# Patient Record
Sex: MALE | Race: WHITE | URBAN - METROPOLITAN AREA
[De-identification: names, ages, dates, MRNs, and addresses within clinical notes are randomized per-mention and may not be internally consistent; named-entity substitution may affect disease eponyms.]

---

## 2024-05-19 ENCOUNTER — HOSPITAL ENCOUNTER (EMERGENCY)
Age: 49
Discharge: HOME | End: 2024-05-19
Attending: EMERGENCY MEDICINE
Payer: COMMERCIAL

## 2024-05-19 ENCOUNTER — APPOINTMENT (OUTPATIENT)
Dept: GENERAL RADIOLOGY | Age: 49
End: 2024-05-19
Payer: COMMERCIAL

## 2024-05-19 VITALS
HEART RATE: 90 BPM | RESPIRATION RATE: 18 BRPM | TEMPERATURE: 98.5 F | SYSTOLIC BLOOD PRESSURE: 156 MMHG | DIASTOLIC BLOOD PRESSURE: 89 MMHG | OXYGEN SATURATION: 96 %

## 2024-05-19 DIAGNOSIS — S81.812A LACERATION OF LEFT LOWER EXTREMITY, INITIAL ENCOUNTER: Primary | ICD-10-CM

## 2024-05-19 PROCEDURE — 73590 X-RAY EXAM OF LOWER LEG: CPT

## 2024-05-19 PROCEDURE — 99283 EMERGENCY DEPT VISIT LOW MDM: CPT

## 2024-05-19 PROCEDURE — 12042 INTMD RPR N-HF/GENIT2.6-7.5: CPT

## 2024-05-19 RX ORDER — LIDOCAINE HYDROCHLORIDE AND EPINEPHRINE BITARTRATE 20; .01 MG/ML; MG/ML
20 INJECTION, SOLUTION SUBCUTANEOUS ONCE
Status: DISCONTINUED | OUTPATIENT
Start: 2024-05-19 | End: 2024-05-20 | Stop reason: HOSPADM

## 2024-05-19 RX ORDER — METFORMIN HYDROCHLORIDE 500 MG/1
500 TABLET, EXTENDED RELEASE ORAL DAILY
COMMUNITY
Start: 2024-05-01

## 2024-05-19 RX ORDER — LOSARTAN POTASSIUM 25 MG/1
50 TABLET ORAL DAILY
COMMUNITY
Start: 2024-04-01

## 2024-05-19 RX ORDER — ROSUVASTATIN CALCIUM 5 MG/1
5 TABLET, COATED ORAL DAILY
COMMUNITY
Start: 2024-05-01

## 2024-05-19 ASSESSMENT — PAIN DESCRIPTION - PAIN TYPE: TYPE: ACUTE PAIN

## 2024-05-19 ASSESSMENT — PAIN DESCRIPTION - ORIENTATION: ORIENTATION: LEFT;LOWER;ANTERIOR

## 2024-05-19 ASSESSMENT — PAIN - FUNCTIONAL ASSESSMENT: PAIN_FUNCTIONAL_ASSESSMENT: 0-10

## 2024-05-19 ASSESSMENT — PAIN DESCRIPTION - LOCATION: LOCATION: LEG

## 2024-05-19 ASSESSMENT — PAIN SCALES - GENERAL: PAINLEVEL_OUTOF10: 2

## 2024-05-20 ASSESSMENT — ENCOUNTER SYMPTOMS
BACK PAIN: 0
COUGH: 0
CHEST TIGHTNESS: 0

## 2024-05-20 NOTE — ED PROVIDER NOTES
Emergency Department Attending Physician Note  Location: [facility] ED  5/19/2024       Pt Name: Hi Jacobson  MRN: [id number]  Birthdate 1975    Date of evaluation: 5/19/2024  Provider: KIM RUIZ DO  PCP: No primary care provider on file.    Note Started: 10:44 PM EDT 5/19/24    CHIEF COMPLAINT  Chief Complaint   Patient presents with    Laceration     Laceration to Left lower leg. Was chopping wood and a pc hit is leg. Bleeding controlled during triage. UTD on tetanus. Jan 2024       ROOM:     HISTORY OF PRESENT ILLNESS:  History obtained by patient. Limitations to history : None.     Hi Jacobson is a 49 y.o. male with a significant PMHx of diabetes, hypertension, chronic lower extremity swelling, supposed to be wearing compression stockings, presenting with department today with a left lower extremity laceration, about 3 cm x 1 cm L-shaped, after he was chopping some wood and some of the wood snapped back and hit him in the shin.  Does not feel like there is a foreign body in there.  Bleeding controlled.  Tetanus shot was last January.  No other concerns today in the emergency department.  Did not fall to the ground.  No other injuries.      Nursing Notes were all reviewed and agreed with or any disagreements were addressed in the HPI.      MEDICAL HISTORY  Past Medical History:   Diagnosis Date    Diabetes (HCC)     Hypertension         SURGICAL HISTORY  No past surgical history on file.    CURRENT MEDICATIONS  Discharge Medication List as of 5/19/2024 10:47 PM        CONTINUE these medications which have NOT CHANGED    Details   losartan (COZAAR) 25 MG tablet Take 2 tablets by mouth dailyHistorical Med      metFORMIN (GLUCOPHAGE-XR) 500 MG extended release tablet Take 1 tablet by mouth dailyHistorical Med      rosuvastatin (CRESTOR) 5 MG tablet Take 1 tablet by mouth dailyHistorical Med             ALLERGIES  Lisinopril and Penicillins    FAMILYHISTORY  No family history on